# Patient Record
Sex: FEMALE | Race: BLACK OR AFRICAN AMERICAN | NOT HISPANIC OR LATINO | Employment: FULL TIME | URBAN - METROPOLITAN AREA
[De-identification: names, ages, dates, MRNs, and addresses within clinical notes are randomized per-mention and may not be internally consistent; named-entity substitution may affect disease eponyms.]

---

## 2017-01-10 ENCOUNTER — HISTORICAL (OUTPATIENT)
Dept: INTERNAL MEDICINE | Facility: CLINIC | Age: 52
End: 2017-01-10

## 2017-01-18 ENCOUNTER — HISTORICAL (OUTPATIENT)
Dept: RADIOLOGY | Facility: HOSPITAL | Age: 52
End: 2017-01-18

## 2017-04-04 ENCOUNTER — HISTORICAL (OUTPATIENT)
Dept: RADIOLOGY | Facility: HOSPITAL | Age: 52
End: 2017-04-04

## 2017-08-25 ENCOUNTER — HISTORICAL (OUTPATIENT)
Dept: INTERNAL MEDICINE | Facility: CLINIC | Age: 52
End: 2017-08-25

## 2017-09-05 ENCOUNTER — HISTORICAL (OUTPATIENT)
Dept: RADIOLOGY | Facility: HOSPITAL | Age: 52
End: 2017-09-05

## 2017-09-20 ENCOUNTER — HISTORICAL (OUTPATIENT)
Dept: ADMINISTRATIVE | Facility: HOSPITAL | Age: 52
End: 2017-09-20

## 2017-09-22 LAB — FINAL CULTURE: NORMAL

## 2017-12-13 ENCOUNTER — HISTORICAL (OUTPATIENT)
Dept: ENDOSCOPY | Facility: HOSPITAL | Age: 52
End: 2017-12-13

## 2018-02-19 ENCOUNTER — HISTORICAL (OUTPATIENT)
Dept: ADMINISTRATIVE | Facility: HOSPITAL | Age: 53
End: 2018-02-19

## 2018-02-19 LAB
ABS NEUT (OLG): 3.34 X10(3)/MCL (ref 2.1–9.2)
APPEARANCE, UA: ABNORMAL
BACTERIA #/AREA URNS AUTO: ABNORMAL /[HPF]
BASOPHILS # BLD AUTO: 0.03 X10(3)/MCL
BASOPHILS NFR BLD AUTO: 0 % (ref 0–1)
BILIRUB UR QL STRIP: NEGATIVE
CHOLEST SERPL-MCNC: 132 MG/DL
CHOLEST/HDLC SERPL: 2.4 {RATIO} (ref 0–4.4)
COLOR UR: YELLOW
EOSINOPHIL # BLD AUTO: 0.02 X10(3)/MCL
EOSINOPHIL NFR BLD AUTO: 0 % (ref 0–5)
ERYTHROCYTE [DISTWIDTH] IN BLOOD BY AUTOMATED COUNT: 13.2 % (ref 11.5–14.5)
GLUCOSE (UA): NORMAL
HAV IGM SERPL QL IA: NONREACTIVE
HBV CORE IGM SERPL QL IA: NONREACTIVE
HBV SURFACE AG SERPL QL IA: NEGATIVE
HCT VFR BLD AUTO: 40.2 % (ref 35–46)
HCV AB SERPL QL IA: NONREACTIVE
HDLC SERPL-MCNC: 54 MG/DL
HGB BLD-MCNC: 13.5 GM/DL (ref 12–16)
HGB UR QL STRIP: 0.1 MG/DL
HIV 1+2 AB+HIV1 P24 AG SERPL QL IA: NONREACTIVE
HYALINE CASTS #/AREA URNS LPF: ABNORMAL /[LPF]
IMM GRANULOCYTES # BLD AUTO: 0.01 10*3/UL
IMM GRANULOCYTES NFR BLD AUTO: 0 %
INR PPP: 1.09 (ref 0.9–1.2)
KETONES UR QL STRIP: NEGATIVE
LDLC SERPL CALC-MCNC: 64 MG/DL (ref 0–130)
LEUKOCYTE ESTERASE UR QL STRIP: 25 LEU/UL
LYMPHOCYTES # BLD AUTO: 2.44 X10(3)/MCL
LYMPHOCYTES NFR BLD AUTO: 40 % (ref 15–40)
MCH RBC QN AUTO: 30 PG (ref 26–34)
MCHC RBC AUTO-ENTMCNC: 33.6 GM/DL (ref 31–37)
MCV RBC AUTO: 89.3 FL (ref 80–100)
MONOCYTES # BLD AUTO: 0.29 X10(3)/MCL
MONOCYTES NFR BLD AUTO: 5 % (ref 4–12)
NEUTROPHILS # BLD AUTO: 3.34 X10(3)/MCL
NEUTROPHILS NFR BLD AUTO: 54 X10(3)/MCL
NITRITE UR QL STRIP: NEGATIVE
PH UR STRIP: 5.5 [PH] (ref 4.5–8)
PLATELET # BLD AUTO: 368 X10(3)/MCL (ref 130–400)
PMV BLD AUTO: 9.6 FL (ref 7.4–10.4)
PROT UR QL STRIP: 30 MG/DL
PROTHROMBIN TIME: 13.4 SECOND(S) (ref 11.9–14.4)
RBC # BLD AUTO: 4.5 X10(6)/MCL (ref 4–5.2)
RBC #/AREA URNS AUTO: ABNORMAL /[HPF]
SP GR UR STRIP: 1.02 (ref 1–1.03)
SQUAMOUS #/AREA URNS LPF: >100 /[LPF]
T PALLIDUM AB SER QL: NONREACTIVE
TRIGL SERPL-MCNC: 70 MG/DL
TSH SERPL-ACNC: 0.8 MIU/L (ref 0.36–3.74)
UROBILINOGEN UR STRIP-ACNC: NORMAL
VLDLC SERPL CALC-MCNC: 14 MG/DL
WBC # SPEC AUTO: 6.1 X10(3)/MCL (ref 4.5–11)
WBC #/AREA URNS AUTO: ABNORMAL /HPF

## 2018-02-21 LAB
FINAL CULTURE: NORMAL
FINAL CULTURE: NORMAL

## 2018-08-17 ENCOUNTER — HISTORICAL (OUTPATIENT)
Dept: INTERNAL MEDICINE | Facility: CLINIC | Age: 53
End: 2018-08-17

## 2018-08-20 ENCOUNTER — HISTORICAL (OUTPATIENT)
Dept: ADMINISTRATIVE | Facility: HOSPITAL | Age: 53
End: 2018-08-20

## 2018-08-22 LAB
FINAL CULTURE: NORMAL
FINAL CULTURE: NORMAL

## 2019-02-20 ENCOUNTER — HISTORICAL (OUTPATIENT)
Dept: INTERNAL MEDICINE | Facility: CLINIC | Age: 54
End: 2019-02-20

## 2022-04-07 ENCOUNTER — HISTORICAL (OUTPATIENT)
Dept: ADMINISTRATIVE | Facility: HOSPITAL | Age: 57
End: 2022-04-07

## 2022-04-24 VITALS
BODY MASS INDEX: 38.83 KG/M2 | SYSTOLIC BLOOD PRESSURE: 102 MMHG | OXYGEN SATURATION: 100 % | DIASTOLIC BLOOD PRESSURE: 73 MMHG | WEIGHT: 256.19 LBS | HEIGHT: 68 IN

## 2022-04-30 NOTE — OP NOTE
Patient:   Susana Garvey             MRN: [id number]            FIN: [id number]               Age:   52 years     Sex:  Female     :  1965   Associated Diagnoses:   None   Author:   Giovanni Green MD      Procedure   Colonoscopy procedure   Physical Exam: vital signs Vital Signs   2017 8:18 CST      Temperature Oral          36.7 DegC                             Temperature Oral (calculated)             98.06 DegF                             Heart Rate Monitored      81 bpm                             Respiratory Rate          20 br/min                             SpO2                      98 %                             Oxygen Therapy            Room air                             Systolic Blood Pressure   111 mmHg                             Diastolic Blood Pressure  81 mmHg                             Mean Arterial Pressure, Cuff              91 mmHg                             Blood Pressure Location   Left arm     .        Impression and Plan   DATE OF PROCEDURE:  17    PATIENT NAME: Susana Garvey    MRN: [id number]  PREOPERATIVE DIAGNOSIS:  Hx colon polyps  POSTOPERATIVE DIAGNOSIS:  colon polyps, diverticulosis, internal hemorrhoids  PROCEDURE PERFORMED:    Colonoscopy  ENDOSCOPIST:  Patrice Branham MD  ASSISTANT:  Giovanni Green MD  ANESTHESIA:  Deep Sedation  EXTENT OF EXAM:  To the cecum.  EBL: none  PREPARATION:  Fair.  LIMITATIONS:  None.  INDICATIONS FOR EXAMINATION:  The patient is a 63yo F Hx colon polyps here for colonoscopy  PROCEDURE IN DETAIL:  A physical examination was performed. The major risks and benefits associated with the procedure were explained to the patient in detail. The patient verbalized understanding and agreement with the same.  The patient was connected to the appropriate monitoring devices and an IV was started. Continuous oxygen was provided via nasal cannula and intravenous sedation was administered in divided doses throughout the procedure.   After  adequate sedation was achieved, the patient was placed in the left lateral decubitus position and a digital rectal exam was performed. This examination was within normal limits. A well-lubricated colonoscope was then inserted into the rectum and advanced under direct visualization to the level of the cecum. The cecum was identified by both visual and anatomic landmarks. A photograph was taken of the cecal cap, as well as the intussuscepting ileum. The scope was then fully withdrawn while examining the color, texture, anatomy and integrity of the mucosa from the cecum to the anal canal. The findings were consistent with diverticulosis and colon polyps. Multiple small <1cm polyps were seen at 20cm and 15cm. Cold forceps were used for biopsy/removal. The scope was then retroflexed to view the dentate line. Large internal hemorrhoidss noted. The scope was completely retrieved upon exiting the anal canal and the procedure was terminated. The patient was then transferred to the recovery room in stable condition.  ENDOSCOPIC DIAGNOSIS:  Internal hemorrhoids, diverticulosis, colon polyps  RECOMMENDATIONS:  Follow up in 5 years repeat colonoscopy. Bx results in 2 weeks.

## 2022-05-02 NOTE — HISTORICAL [facility]ER
This is a historical note converted from Arnel. Formatting and pictures may have been removed.  Please reference Arnel for original formatting and attached multimedia. Chief Complaint  6 month follow up - rx refills  History of Present Illness  52 year AAF here today. HTN at goal. Pt reports constipation and takes Miralax daily.? HDL at goal?LDL?71??HDL 51. Pt reports increased bleeding times, with cuts takes longer to stop bleeding. Pt has Recurrent fungal skin rash that has started back with warm weather on her mid section. ?Pt reports dysuria and recurrent UTIs.  Review of Systems  All Systems Negative Except: See HPI  Physical Exam  Vitals & Measurements  T:?36.9? ?C (Oral)? RR:?18? BP:?109/77?  HT:?172?cm? HT:?172?cm? WT:?115?kg? WT:?115?kg? BMI:?38.87?  General:? Alert and oriented, No acute distress, General health good  Eye:? Pupils are equal, round and reactive to light, Normal conjunctiva.?  HENT:? Normocephalic, Normal hearing, Oral mucosa is moist, No pharyngeal erythema.?  Neck:? Supple, Non-tender, No carotid bruit, No jugular venous distention, No lymphadenopathy, No thyromegaly.?  Respiratory:? Lungs are clear to auscultation, Respirations are non-labored, Breath sounds are equal, Symmetrical chest wall expansion.?  Cardiovascular:? Normal rate, Regular rhythm, No murmur, Good pulses equal in all extremities, Normal peripheral perfusion,recurrent fungal ?No edema.?  Gastrointestinal:? Soft, Non-tender, Non-distended, Normal bowel sounds, No organomegaly.?  Musculoskeletal: Normal range of motion, Normal strength, No tenderness, No deformity, Normal gait.?  Integumentary:? Warm, Dry.?Fungal skin rash mid section  Neurologic:? Alert, Oriented.?  Psychiatric:? Cooperative, Appropriate mood & affect.?  ?  ?  ?  Assessment/Plan  Bleeding tendency  PT/INR  Ordered:  PT  ?  Constipated  Continue Miralax daily  Use Lactulose PRN  Drink plenty of water dialy  Fruits and vegetables  daily  Ordered:  lactulose, 10 gm = 15 mL, Oral, Daily, PRN PRN as needed for constipation, # 60 mL, 2 Refill(s), Pharmacy: [id number]  ?  Fungal skin infection  Use cream as needed  Ordered:  ketoconazole topical, 1 bobby, TOP, Daily, # 30 gm, 1 Refill(s), Pharmacy: [facility] [id number]  ?  HTN - Hypertension  At goal  Low sodium diet (Dash Diet)  Continue Medications as prescribed  Monitor Blood daily, report any consistent numbers greater than 140/90  RTC 6 month  Maintain healthy weight  Ordered:  hydrochlorothiazide-spironolactone, 1 tab(s), Oral, Daily, # 90 tab(s), 3 Refill(s), Pharmacy: [facility] [id number]  irbesartan, 150 mg = 1 tab(s), Oral, Daily, # 30 tab(s), 11 Refill(s), Pharmacy: [facility] [id number]  Urinalysis with Microscopic if Indicated  ?  Hyperlipidemia  ?LDL?71??HDL 51??  Continue Statin at night / Continue diet modifications  Follow a?low cholesterol, low saturated fat diet with less that 200mg of cholesterol a day  Avoid Fried foods and high saturated fats (high saturated fats less than 7% of calories)  Add flax seed or Fish oil supplements?to diet, if not DM eat?a bowl of oatmeal or high fiber cereal for?breakfast  Regular exercise can reduce LDL and raise HDL, reduce weight to help lower cholesterol?  Ordered:  Lipid Panel  ?  Recurrent UTI  Urinalysis and culture  Ordered:  Urinalysis with Microscopic if Indicated  Urine Culture [id number]  ?  Wellness examination  Labs today  Ordered:  CBC w/ Auto Diff  [place] [facility] [place] 1 and 2  Syphilis Antibody (with Reflex RPR)  Thyroid Stimulating Hormone  ?  Orders:  lovastatin, 10 mg = 1 tab(s), Oral, Daily, # 30 tab(s), 11 Refill(s), Pharmacy: [facility] [id number]   Problem List/Past Medical History  Ongoing  Diverticulosis  Hemorrhoids  HTN - Hypertension  Hyperlipidemia  Obesity  Historical  No qualifying data  Procedure/Surgical History  Biopsy Gastrointestinal (12/13/2017), Colonoscopy  (12/13/2017), Colonoscopy, flexible; with biopsy, single or multiple (12/13/2017), Excision of Large Intestine, Via Natural or Artificial Opening Endoscopic, Diagnostic (12/13/2017), Closure of rectovaginal fistula; vaginal or transanal approach (06/07/2016), Exam Under Anesthesia Vaginal (None) (06/07/2016), Excision of Perineum Subcutaneous Tissue and Fascia, Open Approach (06/07/2016), Perineorraphy (06/07/2016), Repair Perineum Skin, External Approach (06/07/2016), Simple repair of superficial wounds of scalp, neck, axillae, external genitalia, trunk and/or extremities (including hands and feet); 2.5 cm or less (06/07/2016), breast reduction, episiotomy to repair hematoma, lap kristin, partial Hysterectomy.  Medications  hydrochlorothiazide-spironolactone 25 mg-25 mg oral tablet, 1 tab(s), Oral, Daily, 3 refills  Ibuprofen 800 Mg Tablet, Oral  irbesartan 150 mg oral tablet, 150 mg= 1 tab(s), Oral, Daily, 11 refills  ketoconazole 2% topical cream, 1 bobby, TOP, Daily, 1 refills  lactulose 10 g/15 mL oral syrup, 10 gm= 15 mL, Oral, Daily, PRN, 2 refills  lactulose 10 g/15 mL oral syrup, 10 gm= 15 mL, Oral, Daily, PRN  LIDOCAINE 3%/HC 0.5% CREAM 28.35GM, TOP, TID  lovastatin 10 mg oral tablet, 10 mg= 1 tab(s), Oral, Daily, 11 refills  One-A-Day Essentials oral tablet  Premarin 0.625 mg/g vaginal cream with applicator, 0.5 gm, VAG, qPM, 3 refills  Allergies  penicillins  Social History  Alcohol  Never, 01/19/2016  Employment/School  Employed, 03/04/2016  Home/Environment  Lives with Children, Spouse., 03/04/2016  Nutrition/Health  Regular, 03/03/2017  Sexual  Sexually active: Yes. Sexually active at age 19 Years. Number of current partners 1. Number of lifetime partners 1. Sexual orientation: Heterosexual. Uses condoms: No. History of sexual abuse: No., 09/20/2017  Substance Abuse  Never, 01/19/2016  Tobacco  Never smoker, 01/19/2016  Family History  Acute myocardial infarction.: Mother.  Cardiac arrhythmia.:  Mother.  Chronic kidney disease: Mother.  Diabetes mellitus type 2: Mother and Father.  EMPHYSEMA: Mother.  Heart disease 27-APR-2016 23:30:55<$>: Mother.  Hypertension.: Mother, Father, Sister and Brother.  Neuropathy: Mother.  Primary malignant neoplasm of prostate: Father.  Renal failure syndrome.: Brother.  Thyroid cancer: Mother.

## 2022-05-02 NOTE — HISTORICAL [facility]
This is a historical note converted from Arnel. Formatting and pictures may have been removed.  Please reference Arnel for original formatting and attached multimedia. Chief Complaint  F/U  History of Present Illness  53 year old Female here today for F/U. HTN at goal. Vit D 23.10 pt is on Vit D 2000 IU daily. Pt has dysuria with burning with urination she has hx of repeat UTIs.  Review of Systems  All Systems Negative Except: See HPI  Physical Exam  Vitals & Measurements  T:?36.7? ?C (Oral)? HR:?53(Peripheral)? RR:?18? BP:?102/73?  HT:?172.72?cm? HT:?172.72?cm? WT:?116.2?kg? WT:?116.2?kg? BMI:?38.95?  General:? Alert and oriented, No acute distress, General health good  Eye:? Pupils are equal, round and reactive to light, Normal conjunctiva.?  HENT:? Normocephalic, Normal hearing, Oral mucosa is moist, No pharyngeal erythema.?  Neck:? Supple, Non-tender, No carotid bruit, No jugular venous distention, No lymphadenopathy, No thyromegaly.?  Respiratory:? Lungs are clear to auscultation, Respirations are non-labored, Breath sounds are equal, Symmetrical chest wall expansion.?  Cardiovascular:? Normal rate, Regular rhythm, No murmur, Good pulses equal in all extremities, Normal peripheral perfusion, No edema.?  Gastrointestinal:? Soft, Non-tender, Non-distended, Normal bowel sounds, No organomegaly.?  Musculoskeletal: Normal range of motion, Normal strength, No tenderness, No deformity, Normal gait.?  Integumentary:? Warm, Dry.?  Neurologic:? Alert, Oriented.?  Psychiatric:? Cooperative, Appropriate mood & affect.?  ?  ?  ?  Assessment/Plan  Dysuria  Urine and culture  Ordered:  Urinalysis with Microscopic if Indicated, Routine collect, Urine, Order for future visit, *Est. 08/20/18 3:00:00 CDT, *Est. Stop date 08/20/18 3:00:00 CDT, Nurse collect, Dysuria, 08/20/18 13:11:00 CDT  Urine Culture [id number], Routine collect, *Est. 08/20/18 3:00:00 CDT, Order for future visit, Urine, Nurse collect, *Est. Stop date 08/20/18  3:00:00 CDT, Dysuria  ?  HTN (hypertension)  Low sodium diet (Dash Diet)  Continue Medications as prescribed  Monitor Blood daily, report any consistent numbers greater than 140/90  Smoking cessation to reduce BP  Maintain healthy weight  Ordered:  Clinic Follow up, *Est. 03/20/19 3:00:00 CDT, 6 month with labs, Order for future visit, HTN (hypertension)  Vitamin D deficiency, [facility]  ?  Visit for screening mammogram  ??screening mammogram screening  Ordered:  MG Screening, Routine, *Est. 09/16/18 3:00:00 CDT, Screening, screening, None, Stretcher, Rad Type, Order for future visit, Visit for screening mammogram, Schedule this test, [facility], *Est. 09/16/18 3:00:00 CDT  ?  Vitamin D deficiency  ??Eat foods high in Vit D ([place], Tuna. cheese, egg yolks)  Exposure to sunlight daily (sunlight through windows does not increase Vit D)?  Prednisone, PPI meds for reflux and seizure medications can lower levels?  Ordered:  Clinic Follow up, *Est. 03/20/19 3:00:00 CDT, 6 month with labs, Order for future visit, HTN (hypertension)  Vitamin D deficiency, [facility]  ?  Wellness examination  ??Labs 1 week before appt  Ordered:  CBC w/ Auto Diff, Routine collect, *Est. 03/20/19 3:00:00 CDT, Blood, Order for future visit, *Est. Stop date 03/20/19 3:00:00 CDT, Lab Collect, Wellness examination, 08/20/18 12:09:00 CDT  Comprehensive Metabolic Panel, Routine collect, *Est. 03/20/19 3:00:00 CDT, Blood, Order for future visit, *Est. Stop date 03/20/19 3:00:00 CDT, Lab Collect, Wellness examination, 08/20/18 12:09:00 CDT  Lipid Panel, Routine collect, *Est. 03/20/19 3:00:00 CDT, Blood, Order for future visit, *Est. Stop date 03/20/19 3:00:00 CDT, Lab Collect, Wellness examination, 08/20/18 12:09:00 CDT  Thyroid Stimulating Hormone, Routine collect, *Est. 03/20/19 3:00:00 CDT, Blood, Order for future visit, *Est. Stop date 03/20/19 3:00:00 CDT, Lab Collect, Wellness examination, 08/20/18 12:10:00  CDT  Urinalysis with Microscopic if Indicated, Routine collect, Urine, Order for future visit, *Est. 03/20/19 3:00:00 CDT, *Est. Stop date 03/20/19 3:00:00 CDT, Nurse collect, Wellness examination, 08/20/18 12:10:00 CDT  Vitamin D, 25-Hydroxy Level, Routine collect, *Est. 03/20/19 3:00:00 CDT, Blood, Order for future visit, *Est. Stop date 03/20/19 3:00:00 CDT, Lab Collect, Wellness examination, 08/20/18 12:10:00 CDT  ?   Problem List/Past Medical History  Ongoing  Diverticulosis  Hemorrhoids  HTN - Hypertension  Hyperlipidemia  Obesity  Historical  No qualifying data  Procedure/Surgical History  Biopsy Gastrointestinal (12/13/2017)  Colonoscopy (12/13/2017)  Colonoscopy, flexible; with biopsy, single or multiple (12/13/2017)  Excision of Large Intestine, Via Natural or Artificial Opening Endoscopic, Diagnostic (12/13/2017)  Closure of rectovaginal fistula; vaginal or transanal approach (06/07/2016)  Exam Under Anesthesia Vaginal (None) (06/07/2016)  Excision of Perineum Subcutaneous Tissue and Fascia, Open Approach (06/07/2016)  Perineorraphy (06/07/2016)  Repair Perineum Skin, External Approach (06/07/2016)  Simple repair of superficial wounds of scalp, neck, axillae, external genitalia, trunk and/or extremities (including hands and feet); 2.5 cm or less (06/07/2016)  breast reduction  episiotomy to repair hematoma  lap kristin  partial Hysterectomy   Medications  B-12 1000 mcg oral tablet, extended release, 1000 mcg= 1 tab(s), Oral, Daily  hydrochlorothiazide-spironolactone 25 mg-25 mg oral tablet, 1 tab(s), Oral, Daily, 3 refills  Ibuprofen 800 Mg Tablet, Oral  irbesartan 150 mg oral tablet, 150 mg= 1 tab(s), Oral, Daily, 3 refills  ketoconazole 2% topical cream, 1 bobby, TOP, Daily, 1 refills  LIDOCAINE 3%/HC 0.5% CREAM 28.35GM, TOP, TID  lovastatin 10 mg oral tablet, 10 mg= 1 tab(s), Oral, Daily, 3 refills  metoprolol tartrate 25 mg oral tablet, 12.5 mg= 0.5 tab(s), Oral, BID, 3 refills  One-A-Day Essentials oral  tablet  Premarin 0.625 mg/g vaginal cream with applicator, 0.5 gm, VAG, qPM, 3 refills  Allergies  Seafood?(swelling)  penicillins  Social History  Alcohol  Never, 01/19/2016  Employment/School  Employed, 03/04/2016  Home/Environment  Lives with Children, Spouse., 03/04/2016  Nutrition/Health  Regular, 03/03/2017  Sexual  Sexually active: Yes. Sexually active at age 19 Years. Number of current partners 1. Number of lifetime partners 1. Sexual orientation: Heterosexual. Uses condoms: No. History of sexual abuse: No., 09/20/2017  Substance Abuse  Never, 01/19/2016  Tobacco  Never smoker, 01/19/2016  Family History  Acute myocardial infarction.: Mother.  Cardiac arrhythmia.: Mother.  Chronic kidney disease: Mother.  Diabetes mellitus type 2: Mother and Father.  EMPHYSEMA: Mother.  Heart disease 27-APR-2016 23:30:55<$>: Mother.  Hypertension.: Mother, Father, Sister and Brother.  Neuropathy: Mother.  Primary malignant neoplasm of prostate: Father.  Renal failure syndrome.: Brother.  Thyroid cancer: Mother.  Health Maintenance  Health Maintenance  ???Pending?(in the next year)  ??? ??Due?  ??? ? ? ?Alcohol Misuse Screening due??08/16/18??and every 1??year(s)  ??? ? ? ?Diabetes Screening due??08/20/18??and every?  ??? ? ? ?Hypertension Management-Education due??08/20/18??and every 1??year(s)  ??? ? ? ?Tetanus Vaccine due??08/20/18??and every 10??year(s)  ??? ??Due In Future?  ??? ? ? ?Blood Pressure Screening not due until??05/14/19??and every 1??year(s)  ??? ? ? ?Body Mass Index Check not due until??05/14/19??and every 1??year(s)  ??? ? ? ?Hypertension Management-Blood Pressure not due until??05/14/19??and every 1??year(s)  ??? ? ? ?Hypertension Management-BMP not due until??08/17/19??and every 1??year(s)  ???Satisfied?(in the past 1 year)  ??? ??Satisfied?  ??? ? ? ?Blood Pressure Screening on??08/20/18.??Satisfied by Myrtle Moon LPN  ??? ? ? ?Body Mass Index Check on??08/20/18.??Satisfied by Myrtle Moon LPN  [place]  ??? ? ? ?Breast Cancer Screening on??09/16/18.??Satisfied by Ciara Tubbs NP  ??? ? ? ?Colorectal Screening on??12/13/17.  ??? ? ? ?Depression Screening on??08/20/18.??Satisfied by Myrtle Moon LPN  ??? ? ? ?Diabetes Screening on??03/20/19.??Satisfied by Ciara Tubbs NP  ??? ? ? ?Hypertension Management-Blood Pressure on??08/20/18.??Satisfied by Myrtle Moon LPN  ??? ? ? ?Lipid Screening on??03/20/19.??Satisfied by Ciara Tubbs NP  ??? ? ? ?Obesity Screening on??08/20/18.??Satisfied by Myrtle Moon LPN  ?  ?

## 2022-05-02 NOTE — HISTORICAL [facility] CERNER
This is a historical note converted from Arnel. Formatting and pictures may have been removed.  Please reference Arnel for original formatting and attached multimedia. Chief Complaint  Annual Pap  History of Present Illness  53 y/o  here for well woman exam and follow up. 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 She reports no vaginal discharge, pain, or bleeding since procedure. She has resumed sexual intercourse with no issue. She does complain of some occasional dysuria and notes sometimes seeing speckles in her urine. Not sure if they are blood or something else and she is certain they are not coming from her vagina. Does report some chronic constipation, is having bowel movement every 3 days.  Review of Systems  Constitutional - denies fever, chills, unintended?weight loss  Cardiovascular - denies chest pain, palpitations  Respiratory - denies shortness of breath, wheezing  Gastrointestinal - denies abdominal pain, acid reflux, nausea, vomiting, diarrhea, constipation, blood in the stool  Genitourinary - denies vaginal discharge, vulvovaginal itching, dyspareunia, pelvic pain, dysuria, hematuria  Musculoskeletal - denies back pain  Neurologic - denies numbness or tingling in her extremities  Physical Exam  Vitals & Measurements  T:?36.5? ?C ?(Oral)? HR:?70?(Peripheral)? RR:?20? BP:?105/73?  HT:?175?cm? HT:?175?cm? WT:?118.0?kg? WT:?118.0?kg? BMI:?38.53?  General - AOx3 in NAD, appears well, appears stated age  Eye - EOMI  HENT - Normocephalic atraumatic.  Respiratory - LCTAB, no rales/rhonchi/wheezes  Cardiovascular - RRR, S1/S2  Breasts equal in size and contour, no masses/lesions noted.  Gastrointestinal - Abdomen soft, nontender, NABS x 4.  Genitourinary - External genitalia without abnormality. Normal mons, clitoris, urethra, introitus, labia, perineum, and anus. Vaginal vault pink and moist. No  discharge. Area of rectovaginal fistula with minimal yellow-tinged discharge overlying but no friable tissue, erythema, or visible defect. No areas palpated simulating hard woody feel of prior fistula.  Musculoskeletal?- No calf tenderness  Neurologic?- Normal strength and reflexes in bilateral upper and lower extremities.  Skin - No rashes/lesions/bruises.  Assessment/Plan  Abnormal finding in urine  Ordered:  [facility] 40-64 years [id number] PC, Well woman exam with routine gynecological exam  Endometriosis of rectovaginal septum and vagina  Abnormal finding in urine  Screening for colon cancer, [facility], 09/20/17 9:10:00 CDT  Urine Culture [id number], Routine collect, 09/20/17 9:20:00 CDT, Urine, Clean Catch, Nurse collect, Stop date 09/20/17 9:20:00 CDT, Abnormal finding in urine  ?  Endometriosis of rectovaginal septum and vagina  No evidence of recurrence  Ordered:  [facility] 40-64 years [id number] PC, Well woman exam with routine gynecological exam  Endometriosis of rectovaginal septum and vagina  Abnormal finding in urine  Screening for colon cancer, [facility], 09/20/17 9:10:00 CDT  ?  Screening for colon cancer  Ordered:  Internal Referral to Gastroenterology, follow up colonoscopy - last done 3-4 years ago at [facility], polyps removed in [id number], was told to come back in 3-5 years, *Est. 10/20/17 3:00:00 CDT, Future Visit?, Screening for colon cancer  [facility] 40-64 years [id number] , Well woman exam with routine gynecological exam  Endometriosis of rectovaginal septum and vagina  Abnormal finding in urine  Screening for colon cancer, [facility], 09/20/17 9:10:00 CDT  ?  Well woman exam with routine gynecological exam  Ordered:  Clinic Follow up, *Est. 09/20/18 3:00:00 CDT, Order for future visit, Well woman exam with routine gynecological exam, [facility] 40-64 years [id number] PC, Well woman exam  with routine gynecological exam  Endometriosis of rectovaginal septum and vagina  Abnormal finding in urine  Screening for colon cancer, [facility] [place] [facility], 17 9:10:00 CDT  ?  53 y/o  here for well woman exam and follow up after resection of rectovaginal fistula. Doing well with no evidence of fistula or endometriosis recurrence.  ?  WWE: Otherwise up to date with mammogram. Needs repeat colonoscopy as last 3-4 years ago with polyp removal.  Has noted some urine abnormality, will send for analysis and culture.  ?  RTO in 1 year otherwise.   Problem List/Past Medical History  Ongoing  HTN - Hypertension  Hyperlipidemia  Obesity  Historical  Procedure/Surgical History  Closure of rectovaginal fistula; vaginal or transanal approach (2016)  Exam Under Anesthesia Vaginal (None) (2016)  Excision of Perineum Subcutaneous Tissue and Fascia, Open Approach (2016)  Perineorraphy (2016)  Repair Perineum Skin, External Approach (2016)  Simple repair of superficial wounds of scalp, neck, axillae, external genitalia, trunk and/or extremities (including hands and feet); 2.5 cm or less (2016)  breast reduction  episiotomy to repair hematoma  lap kristin  partial Hysterectomy  Medications  cyclobenzaprine 10 mg oral tablet, 10 mg= 1 tab(s), Oral, TID, 1 refills  hydrochlorothiazide-spironolactone 25 mg-25 mg oral tablet, 1 tab(s), Oral, Daily, 3 refills  Ibuprofen 800 Mg Tablet, Oral  irbesartan 150 mg oral tablet, 150 mg= 1 tab(s), Oral, Daily, 11 refills  lovastatin 10 mg oral tablet, 10 mg= 1 tab(s), Oral, Daily, 11 refills  Mobic 15 mg oral tablet, 15 mg= 1 tab(s), Oral, Daily, 5 refills  One-A-Day Essentials oral tablet  Premarin 0.625 mg/g vaginal cream with applicator, 0.5 gm, VAG, qPM, 3 refills  Vistaril 50 mg oral capsule, 50 mg= 1 cap(s), Oral, At Bedtime, 4 refills  Allergies  penicillins  Social History  Alcohol - 2017  Never  Employment/School -  09/20/2017  Employed  Home/Environment - 09/20/2017  Lives with Children, Spouse.  Nutrition/Health - 09/20/2017  Regular  Sexual - 09/20/2017  Sexually active: Yes. Sexually active at age 19 Years. Number of current partners 1. Number of lifetime partners 1. Sexual orientation: Heterosexual. Uses condoms: No. History of sexual abuse: No.  Substance Abuse - 09/20/2017  Never  Tobacco - 09/20/2017  Never smoker  Family History  Acute myocardial infarction.: Mother.  Cardiac arrhythmia.: Mother.  Chronic kidney disease: Mother.  Diabetes mellitus type 2: Mother and Father.  EMPHYSEMA: Mother.  Heart disease 27-APR-2016 23:30:55<$>: Mother.  Hypertension.: Mother, Father, Sister and Brother.  Neuropathy: Mother.  Primary malignant neoplasm of prostate: Father.  Renal failure syndrome.: Brother.  Thyroid cancer: Mother.      Reviewed the patients medical history, residents findings on physical exam, and the diagnosis with treatment plan. Care provided was reasonable and necessary.